# Patient Record
Sex: MALE | NOT HISPANIC OR LATINO | Employment: FULL TIME | URBAN - NONMETROPOLITAN AREA
[De-identification: names, ages, dates, MRNs, and addresses within clinical notes are randomized per-mention and may not be internally consistent; named-entity substitution may affect disease eponyms.]

---

## 2020-02-18 ENCOUNTER — OFFICE VISIT (OUTPATIENT)
Dept: URGENT CARE | Facility: CLINIC | Age: 45
End: 2020-02-18
Payer: COMMERCIAL

## 2020-02-18 VITALS
TEMPERATURE: 97.7 F | HEART RATE: 102 BPM | BODY MASS INDEX: 34.52 KG/M2 | RESPIRATION RATE: 16 BRPM | HEIGHT: 74 IN | WEIGHT: 269 LBS | DIASTOLIC BLOOD PRESSURE: 82 MMHG | OXYGEN SATURATION: 96 % | SYSTOLIC BLOOD PRESSURE: 126 MMHG

## 2020-02-18 DIAGNOSIS — J06.9 VIRAL URI WITH COUGH: ICD-10-CM

## 2020-02-18 PROCEDURE — 99203 OFFICE O/P NEW LOW 30 MIN: CPT | Performed by: FAMILY MEDICINE

## 2020-02-18 RX ORDER — GUAIFENESIN 600 MG/1
600 TABLET, EXTENDED RELEASE ORAL EVERY 12 HOURS
COMMUNITY
End: 2021-12-08

## 2020-02-18 RX ORDER — DEXTROMETHORPHAN HYDROBROMIDE AND PROMETHAZINE HYDROCHLORIDE 15; 6.25 MG/5ML; MG/5ML
5 SYRUP ORAL 3 TIMES DAILY
Qty: 75 ML | Refills: 0 | Status: SHIPPED | OUTPATIENT
Start: 2020-02-18 | End: 2020-02-23

## 2020-02-18 ASSESSMENT — ENCOUNTER SYMPTOMS
DIARRHEA: 0
HEADACHES: 0
COUGH: 1
VOMITING: 0
FEVER: 0
SORE THROAT: 1
ABDOMINAL PAIN: 0
SHORTNESS OF BREATH: 0

## 2020-02-18 NOTE — PROGRESS NOTES
"Subjective:     Grey Reynoso is a 44 y.o. male who presents for Cough (coughing and loss of voice, body aches, headache started 4 days ago)    HPI  Pt presents for evaluation of a new problem   Pt with a cough for the past 4 days   Cough is worse at night and unable to sleep more than a few hours per night   Cough is dry nonproductive  Having some chest soreness and sore throat from coughing too much  No fevers  Having body aches and headaches associated    Review of Systems   Constitutional: Negative for fever.   HENT: Positive for sore throat.    Respiratory: Positive for cough. Negative for shortness of breath.    Cardiovascular: Negative for chest pain.   Gastrointestinal: Negative for abdominal pain, diarrhea and vomiting.   Skin: Negative for rash.   Neurological: Negative for headaches.     PMH: History of pneumonia 5 times  MEDS:   Current Outpatient Medications:   •  guaiFENesin ER (MUCINEX) 600 MG TABLET SR 12 HR, Take 600 mg by mouth every 12 hours., Disp: , Rfl:   •  Multiple Vitamin (DAILY VITAMINS PO), Take  by mouth., Disp: , Rfl:   •  predniSONE (DELTASONE) 10 MG Tab, Take 1 Tab by mouth ONE-HALF HOUR AFTER BREAKFAST. Patient is to take 6 tablets on day one, 4 tablets on days 2 and 3, 2 tablets on days 4 and 5 and stop. Or equivalent dose., Disp: 18 Tab, Rfl: 0  ALLERGIES:   Allergies   Allergen Reactions   • Atropine      sensitive   • Epinephrine      Making the heart 6 beats per minute   • Nembutal      SURGHX: None  SOCHX:  reports that he has never smoked. He has never used smokeless tobacco.  FH: Family history was reviewed, not contributing to acute illness     Objective:   /82   Pulse (!) 102   Temp 36.5 °C (97.7 °F)   Resp 16   Ht 1.88 m (6' 2\")   Wt 122 kg (269 lb)   SpO2 96%   BMI 34.54 kg/m²     Physical Exam  Constitutional:       General: He is not in acute distress.     Appearance: He is well-developed. He is not diaphoretic.   HENT:      Head: Normocephalic and " atraumatic.      Right Ear: Tympanic membrane, ear canal and external ear normal.      Left Ear: Tympanic membrane, ear canal and external ear normal.      Nose: Congestion and rhinorrhea present.      Mouth/Throat:      Mouth: Mucous membranes are moist.      Pharynx: Oropharynx is clear. No oropharyngeal exudate or posterior oropharyngeal erythema.   Eyes:      General: No scleral icterus.        Right eye: No discharge.         Left eye: No discharge.      Conjunctiva/sclera: Conjunctivae normal.      Pupils: Pupils are equal, round, and reactive to light.   Neck:      Musculoskeletal: Normal range of motion.      Trachea: No tracheal deviation.   Cardiovascular:      Rate and Rhythm: Normal rate and regular rhythm.      Heart sounds: Normal heart sounds.   Pulmonary:      Effort: Pulmonary effort is normal. No respiratory distress.      Breath sounds: Normal breath sounds. No wheezing or rales.   Musculoskeletal: Normal range of motion.   Skin:     General: Skin is warm and dry.      Findings: No rash.   Neurological:      Mental Status: He is alert.   Psychiatric:         Mood and Affect: Mood normal.         Behavior: Behavior normal.         Thought Content: Thought content normal.         Judgment: Judgment normal.         Assessment/Plan:   Assessment    1. Viral URI with cough  - promethazine-dextromethorphan (PROMETHAZINE-DM) 6.25-15 MG/5ML syrup; Take 5 mL by mouth 3 times a day for 5 days.  Dispense: 75 mL; Refill: 0